# Patient Record
(demographics unavailable — no encounter records)

---

## 2025-01-10 NOTE — PHYSICAL EXAM
[Alert] : alert [Acute Distress] : no acute distress [Normocephalic] : normocephalic [Flat Open Anterior [place]] : flat open anterior fontanelle [Icteric sclera] : nonicteric sclera [PERRL] : PERRL [Red Reflex Bilateral] : red reflex bilateral [Normally Placed Ears] : normally placed ears [Auricles Well Formed] : auricles well formed [Clear Tympanic membranes] : clear tympanic membranes [Light reflex present] : light reflex present [Bony structures visible] : bony structures visible [Patent Auditory Canal] : patent auditory canal [Discharge] : no discharge [Nares Patent] : nares patent [Palate Intact] : palate intact [Uvula Midline] : uvula midline [Supple, full passive range of motion] : supple, full passive range of motion [Palpable Masses] : no palpable masses [Symmetric Chest Rise] : symmetric chest rise [Clear to Auscultation Bilaterally] : clear to auscultation bilaterally [Regular Rate and Rhythm] : regular rate and rhythm [S1, S2 present] : S1, S2 present [Murmurs] : no murmurs [+2 Femoral Pulses] : +2 femoral pulses [Soft] : soft [Tender] : nontender [Distended] : not distended [Bowel Sounds] : bowel sounds present [Umbilical Stump Dry, Clean, Intact] : umbilical stump dry, clean, intact [Hepatomegaly] : no hepatomegaly [Splenomegaly] : no splenomegaly [Normal external genitailia] : normal external genitalia [Central Urethral Opening] : central urethral opening [Testicles Descended Bilaterally] : testicles descended bilaterally [Patent] : patent [Normally Placed] : normally placed [No Abnormal Lymph Nodes Palpated] : no abnormal lymph nodes palpated [Catalan-Ortolani] : negative Catalan-Ortolani [Symmetric Flexed Extremities] : symmetric flexed extremities [Spinal Dimple] : no spinal dimple [Tuft of Hair] : no tuft of hair [Startle Reflex] : startle reflex present [Suck Reflex] : suck reflex present [Rooting] : rooting reflex present [Palmar Grasp] : palmar grasp present [Plantar Grasp] : plantar reflex present [Symmetric Dariana] : symmetric [place] [Jaundice] : not jaundice

## 2025-01-10 NOTE — PHYSICAL EXAM
[Alert] : alert [Acute Distress] : no acute distress [Normocephalic] : normocephalic [Flat Open Anterior [place]] : flat open anterior fontanelle [Icteric sclera] : nonicteric sclera [PERRL] : PERRL [Red Reflex Bilateral] : red reflex bilateral [Normally Placed Ears] : normally placed ears [Auricles Well Formed] : auricles well formed [Clear Tympanic membranes] : clear tympanic membranes [Light reflex present] : light reflex present [Bony structures visible] : bony structures visible [Patent Auditory Canal] : patent auditory canal [Discharge] : no discharge [Nares Patent] : nares patent [Palate Intact] : palate intact [Uvula Midline] : uvula midline [Supple, full passive range of motion] : supple, full passive range of motion [Palpable Masses] : no palpable masses [Symmetric Chest Rise] : symmetric chest rise [Clear to Auscultation Bilaterally] : clear to auscultation bilaterally [Regular Rate and Rhythm] : regular rate and rhythm [S1, S2 present] : S1, S2 present [Murmurs] : no murmurs [+2 Femoral Pulses] : +2 femoral pulses [Soft] : soft [Tender] : nontender [Distended] : not distended [Bowel Sounds] : bowel sounds present [Umbilical Stump Dry, Clean, Intact] : umbilical stump dry, clean, intact [Hepatomegaly] : no hepatomegaly [Splenomegaly] : no splenomegaly [Normal external genitailia] : normal external genitalia [Central Urethral Opening] : central urethral opening [Testicles Descended Bilaterally] : testicles descended bilaterally [Patent] : patent [Normally Placed] : normally placed [No Abnormal Lymph Nodes Palpated] : no abnormal lymph nodes palpated [Catalan-Ortolani] : negative Catalan-Ortolani [Symmetric Flexed Extremities] : symmetric flexed extremities [Spinal Dimple] : no spinal dimple [Tuft of Hair] : no tuft of hair [Startle Reflex] : startle reflex present [Suck Reflex] : suck reflex present [Rooting] : rooting reflex present [Palmar Grasp] : palmar grasp present [Plantar Grasp] : plantar reflex present [Symmetric Dariana] : symmetric [place] [Jaundice] : not jaundice

## 2025-01-10 NOTE — HISTORY OF PRESENT ILLNESS
[Normal] : Normal [No] : No cigarette smoke exposure [Exposure to electronic nicotine delivery system] : No exposure to electronic nicotine delivery system [Water heater temperature set at <120 degrees F] : Water heater temperature set at <120 degrees F [Rear facing car seat in back seat] : Rear facing car seat in back seat [Carbon Monoxide Detectors] : Carbon monoxide detectors at home [Smoke Detectors] : Smoke detectors at home. [FreeTextEntry1] : Hospital Course: Date/Time of Admission 2025 03:55 Admission Weight (KILOGRAMS) 3.03 kg Admission Weight (GRAMS) 3030 Gm Date/Time of Birth 2025 03:55 Admission Weight (POUNDS) 6 Admission Weight (OUNCES) 10.88 Ounce(s) Admission Height (CENTIMETERS) 50 cm Admission Height (INCHES) 19.68 Inch(s) Gestational Age at Birth (WEEKS) 39.5 Week(s) Admission Information Birth Sex: Male   Prenatal Complications:  Admitted From:  Place of Birth:  Resuscitation:  APGAR Scores: 1min:8  5min: 9  10 min: -- Infant Measurement Appropriate for gestational age (AGA) Hospital Course Peds requested at delivery for CAT II. Baby boy, AGA, born on  (03:55) at 39.5 wks via  to a 42 y/o , O+ blood type mother. Maternal history of IVF, abnormal pap resolved, left ovarian cyst, anxiety (sertraline). Prenatal history of **FA: trivial pulmonary regurgitation recommend f/u with peds cardio in 2-3 months. PNL nr/immune/-, GBS - on . SROM at 04:00 on  (~24 HRS) with clear fluids. Baby was w/d/s/s with APGARS of 8/9, void x1, NCx1. Mom would like to breastfeed, consents Hep B and consents circ. Tmax: 38.8C at 21:55, received zosyn at 22:23, tylenol at 22:01. EOS: 1.0 cbc and bcx done, bcx -ve 48hrs.  Since admission to the  nursery, baby has been feeding, voiding, and stooling appropriately. Vitals remained stable during admission. Baby received routine  care.  Discharge weight was 2770 g Weight Change Percentage: -8.58  Discharge Bilirubin Sternum 5.7  at 36 hours of life low risk zone, baby Coy positive with reassuring bilirubin levels  See below for hepatitis B vaccine status, hearing screen and CCHD results. G6PD level sent as part of [facility]  Screening Program. Results pending at time of discharge. Stable for discharge home with instructions to follow up with pediatrician in 1-2 days.  Given the increased risk of exposure to RSV at this time of the year, parents were offered Nirsevimab administration for the . The care team confirmed that baby's Mother did not receive RSV vaccine more than 2 weeks ago. Risks and benefits of Nirsevimab were discussed with the family, and parents would like for it to be administered prior to discharge. Educational materials provided, and all questions answered.  Maternal Information: Maternal History Demographic Information: Prenatal Care: Final AMADEO: Prenatal Lab Tests/Results: HBsAG: -- HIV: -- VDRL: -- Rubella: -- Rubeola: -- GBS Bacteriuria: -- GBS Screen 1st Trimester: -- GBS 36 Weeks: -- Blood Type: Blood Type: O positive  Pregnancy Conditions: Prenatal Medications: Maternal Information LABOR AND DELIVERY ROM:  Medications: Mode of Delivery: Vaginal Delivery  Anesthesia: Presentation: Complications: Discharge Data: Discharge Date 2025 Discharge Information Weight (grams): 2770  Weight (pounds): 6  Weight (ounces): 1.708  % weight change = -8.58% [ Based on Admission weight in grams = 3030.00(2025 07:17), Discharge weight in grams = 2770.00(2025 04:00)]  Height (centimeters): Height in inches = 19.7 [ Based on Height in centimeters = 50.00(2025 04:25)]  Head Circumference (centimeters):  Length of Stay (days): 2d Discharge Laboratory Results CBC: 17.7 15.64 )-----------( 181 ( 25 @ 12:25 ) 52.2   Chem: Liver Functions: Type & Screen: ( 25 @ 05:07 ) ABO/Rh/Coy: A Negative Positive      Bilirubin: (25 @ 12:25) Direct: 0.2 / Indirect: 2.8 / Total: 3.0  TSH: T4: Conditions at Discharge Well Med Reconciliation: Medication Reconciliation Status Admission Reconciliation is Completed Discharge Reconciliation is Completed Vaccine Information Hep B, adolescent or pediatric; 2025 04:30; Rani Rodriguez); AdNear; [id number] (Exp. Date: 13-Sep-2026); IntraMuscular; Vastus Lateralis Left.; 0.5 milliLiter(s); VIS (VIS Published: 12-May-2023, VIS Presented: 2025); RSV, mAb, nirsevimab-alip, 0.5 mL,  to 24 months; 2025 13:11; Zoë Shin (RN); Sanofi Pasteur; [id number] (Exp. Date: 03-Mar-2026); IntraMuscular; Vastus Lateralis Right.; 50 milliGRAM(s); VIS (VIS Published: 11-Dec-2024, VIS Presented: 2025); Synagis Risk Factors For more information on Synagis risk factors, visit: https://publications.aap.org/redbook/book/347/chapter/8362214/Respiratory-Syncyt ial-Virus Care Plan/Procedures: Goal(s) Healthy Baby Discharge Diagnoses, Assessment and Plan of Treatment PRINCIPAL DISCHARGE DIAGNOSIS Diagnosis: Single liveborn, born in hospital, delivered by vaginal delivery Assessment and Plan of Treatment: - Follow-up with your pediatrician within 48 hours of discharge. Routine Home Care Instructions: - Please call us for help if you feel sad, blue or overwhelmed for more than a few days after discharge - Umbilical cord care: - Please keep your baby's cord clean and dry (do not apply alcohol) - Please keep your baby's diaper below the umbilical cord until it has fallen off (~10-14 days) - Please do not submerge your baby in a bath until the cord has fallen off (sponge bath instead) - Continue feeding child at least every 3 hours, wake baby to feed if needed. Please contact your pediatrician and return to the hospital if you notice any of the following: - Fever (T > 100.4) - Reduced amount of wet diapers (< 5-6 per day) or no wet diaper in 12 hours - Increased fussiness, irritability, or crying inconsolably - Lethargy (excessively sleepy, difficult to arouse) - Breathing difficulties (noisy breathing, breathing fast, using belly and neck muscles to breath) - Changes in the babys color (yellow, blue, pale, gray) - Seizure or loss of consciousness   SECONDARY DISCHARGE DIAGNOSES Diagnosis: Need for observation and evaluation of  for sepsis Assessment and Plan of Treatment: Follow Up/Diet: Care Providers for Follow up (PCP/Outpatient Provider) [facility] [address], [address] [place] [address] Phone: [phone] Fax: [phone] Established Patient Follow Up Time: Additional Provider Info (For SysAdmin Use Only) PROVIDER:[TOKEN:[[id number]],ESTABLISHEDPATIENT:[T]] Care Providers Direct Addresses (For SYSAdmin Use Only) [email or web address] NPI number (For SysAdmin Use Only) : [[id number]] Follow-up Clinics (For SysAdmin Use Only) 617283:2 months|| ||00\01||False; Follow-up Clinics [facility] [address], [address] [place] [address] Phone: [phone] Fax: [phone] Follow Up Time: 2 months Patient's Scheduled Appointments [facility] [address] Scheduled Appointment: 2025   Additional Scheduled Appointments APPTS ARE READY TO BE MADE: [X] YES  Best Family or Patient Contact (if needed):  Additional Information about above appointments (if needed):  1: 2: 3:  Other comments or requests: [facility]-Prior to outreaching the patient, it was visible that the patient has secured a follow up appointment which was not scheduled by our team on  at 2pm with dr. silveira at [address] [place] [id number] and [phone] Patient was outreached but did not answer. A voicemail was left for the patient to return our call. x3  The patient has required monitoring during his/her stay, and/or will require outpatient follow up for the following congenital anomalies and clinical conditions, or any others noted in the problem list or physical exam: N/A Patient Current Diet  Patient Education/Instructions: Fall Risk Education For information on Fall & Injury Prevention, visit: [email or web address] -[email or web address] OR [email or web address]-injury OR https://www.cdc.gov/steadi/patient.html Feeding Instructions -Write Down: How many feedings, wet diapers and dirty diapers until seen by your Pediatrician. Burping -Burp after each feeding by supporting the baby on your lap, across your knees or on your shoulder. Pat or rub the 's back gently. MEDICATION INSTRUCTIONS . -Check with your Pediatrician before giving any medications to your baby. -See Discharge Medication Information for Patients and Families' Pocket Card. Parent's Discharge Checklist 1. I was told the name of the doctor(s) who took care of my child while in the hospital.  2. I have been told about any important findings on my child's plan of care.  3. The doctor clearly explained my child's diagnosis and other possible diagnoses that were considered.  4. My child's doctor explained all the tests that were done and their results (if available). I understand that some of the test results may not be ready before we go home and I was told how I can get these results. I understand that a summary of my child's hospitalization and important test results will be shared with my child's outpatient doctor.  5. My child's doctor talked to me about what I need to do when we go home.  6. I understand what signs and symptoms to watch for. I understand what symptoms I would need to call my doctor for and/or return to the hospital.  7. I have the phone number to call the hospital for results and/or questions after I leave the hospital.  APPEARANCE . -'s hands and feet may be bluish in color for a few days. - may have white spots (pimple-like) on the nose and/ or chin. These are Milia and are due to clogged sweat glands. Do not squeeze. - may have an elongated or misshapen head. The head is shaped according to the birth canal for easier birth. This is called molding of the head and will round out in a few days. -Puffy eyes may be due to the birth process or state mandated eye ointment.  ACTIVITY . -Wash hands before touching your baby. -Lay baby on back to sleep: firm mattress, no bumpers, pillows or things other than a blanket in crib. -Keep blanket away from the baby's face. -Bathe with a washcloth until cord falls off and if a boy until circumcision heals. -Limit visiting for 8 weeks and avoid public places. -Rear-facing car seat in backseat of car properly belted in. -Support the baby's head and hold the baby close. -It may be easier to cut the 's fingernails when the  is sleeping. Use a file until you can see that the skin is no longer attached to the nail. CORD CARE . -The cord will gradually dry up and fall off in 2-3 weeks. -Report redness, swelling or drainage from cord to pediatrician. CALL 911 . -If your baby has: Difficulty breathing; blue lips or tongue, and/or does not respond to touch. CALL YOUR PEDIATRICIAN OR RETURN TO THE HOSPITAL If your baby has any of the following, CALL YOUR PEDIATRICIAN OR RETURN TO THE HOSPITAL -WORSENING OF JAUNDICE (yellowing skin) moving from head to toe -Pale skin -Temperature greater than 100 F under arm or rectal temperature greater than 100.4 F -Increased irritability, crying for long periods of time -Abnormal drowsiness, prolonged sleepiness. -Poor feeding (fewer than 5 feedings in 24 hours) -Watery bowel movement or no bowel movement in 24 hours -Fewer than 5 wet diapers per day -Vomiting often or vomiting green material -Bleeding from circumcision site (boy babies only) -Bad smell from umbilical cord. Reddish color of skin around cord or drainage from the cord. -Congested cough, runny eyes, or runny nose Screens: Critical Congenital Heart Defect (CCHD) CCHD Screen []: Initial Pre-Ductal SpO2(%): 98 Post-Ductal SpO2(%): 100 SpO2 Difference(Pre MINUS Post): -2 Extremities Used: Right Hand, Left Foot Result: Passed Follow up: Normal Screen- (No follow-up needed) Infant Screen Screen#: [id number] Screen Date: 2025 Screen Comment: N/A Final Hearing Screen Right ear hearing screen completed date: 2025 Right ear screen method: EOAE (evoked otoacoustic emission) Right ear screen result: Passed Right ear screen comment: N/A  Left ear hearing screen completed date: 2025 Left ear screen method: EOAE (evoked otoacoustic emission) Left ear screen result: Passed Left ear screen comments: N/A Transcutaneous Bilirubin Site: Sternum (2025 04:00) Bilirubin: 5.7 (2025 04:00) Bilirubin: 5.7 (2025 16:57) Site: Sternum (2025 16:57) Site: Sternum (2025 04:15) Bilirubin: 4.8 (2025 04:15) Site: Sternum (2025 20:17) Bilirubin: 4.3 (2025 20:17) Document Complete: Patient ready for discharge by RN (Click here to generate discharge paperwork) Patient/Caregiver provided printed discharge information. Physician Section Complete This document is complete and the patient is ready for discharge. For questions about your prescriptions, please call [phone] Is this contact telephone number correct? Yes Attending Attestation Statement I have personally seen and examined the patient. I have collaborated with and supervised the . ACP . on the discharge service for the patient. I have reviewed and made amendments to the documentation where necessary. Attending Discharge Physical Examination Discharge Physical Exam:  Gen: awake, alert, active HEENT: anterior fontanel open soft and flat. no cleft lip/palate, ears normal set, no ear pits or tags, no lesions in mouth/throat, red reflex positive bilaterally, nares clinically patent Resp: good air entry and clear to auscultation bilaterally Cardiac: Normal S1/S2, regular rate and rhythm, no murmurs, rubs or gallops, 2+ femoral pulses bilaterally Abd: soft, non tender, non distended, normal bowel sounds, no organomegaly, umbilicus clean/dry/intact Neuro: +grasp/suck/cash, normal tone Extremities: negative bearden and ortolani, full range of motion x 4, no clavicular crepitus Skin: pink Genital Exam: testes palpable bilaterally, normal male anatomy, lazaro 1, anus visually patent Date of Discharge Service 2025 Discharging Attending Physician MD [facility] Financial Assistance Health system provides services at a reduced cost to those who are determined to be eligible through [facility] financial assistance program. Information regarding [facility] financial assistance program can be found by going to [email or web address] or by calling [phone]. [facility] Patient Portal Link You can access the FollowMyHealth Patient Portal offered by [facility] by registering at the following website: [email or web address]. By joining [facility] FollowMyHealth portal, you will also be able to view your health information using other applications (apps) compatible with our system.   Electronic Signatures: Vera Ortiz (Access Services) (Signed 2025 10:23) Authored: Follow Up/Diet Kalpana Suárez) (Signed 2025 11:47) Authored: Hospital Course, Discharge Data, Med Reconciliation, Follow Up/Diet, Screens, Document Complete Zoë Shin (RN) (Signed 2025 13:31) Authored: Hospital Course, Med Reconciliation, Document Complete Kaylen Silva (NP) (Signed 2025 05:43) Authored: Hospital Course, Maternal Information, Discharge Data, Med Reconciliation, Care Plan/Procedures, Follow Up/Diet, Patient Education/Instructions, Screens, Document Complete   Last Updated: 2025 10:23 by Vera Ortiz (Access Services)

## 2025-01-10 NOTE — DISCUSSION/SUMMARY
[Normal Growth] : growth [Normal Development] : developmental [No Elimination Concerns] : elimination [Continue Regimen] : feeding [No Skin Concerns] : skin [Normal Sleep Pattern] : sleep [None] : no known medical problems [Anticipatory Guidance Given] : Anticipatory guidance addressed as per the history of present illness section [No Vaccines] : no vaccines needed [No Medications] : ~He/She~ is not on any medications [Parent/Guardian] : Parent/Guardian [FreeTextEntry1] : 3 day old M well IVF   Trivial pulmonary regurg on prenatal US received rsv and hep B vaccines in the hospital  went from 6-11 to 6-2 in the hospital  yi [place] on discharge  RTC in 3 days for weight check

## 2025-01-10 NOTE — HISTORY OF PRESENT ILLNESS
[Normal] : Normal [No] : No cigarette smoke exposure [Exposure to electronic nicotine delivery system] : No exposure to electronic nicotine delivery system [Water heater temperature set at <120 degrees F] : Water heater temperature set at <120 degrees F [Rear facing car seat in back seat] : Rear facing car seat in back seat [Carbon Monoxide Detectors] : Carbon monoxide detectors at home [Smoke Detectors] : Smoke detectors at home. [FreeTextEntry1] : Hospital Course: Date/Time of Admission 2025 03:55 Admission Weight (KILOGRAMS) 3.03 kg Admission Weight (GRAMS) 3030 Gm Date/Time of Birth 2025 03:55 Admission Weight (POUNDS) 6 Admission Weight (OUNCES) 10.88 Ounce(s) Admission Height (CENTIMETERS) 50 cm Admission Height (INCHES) 19.68 Inch(s) Gestational Age at Birth (WEEKS) 39.5 Week(s) Admission Information Birth Sex: Male   Prenatal Complications:  Admitted From:  Place of Birth:  Resuscitation:  APGAR Scores: 1min:8  5min: 9  10 min: -- Infant Measurement Appropriate for gestational age (AGA) Hospital Course Peds requested at delivery for CAT II. Baby boy, AGA, born on  (03:55) at 39.5 wks via  to a 40 y/o , O+ blood type mother. Maternal history of IVF, abnormal pap resolved, left ovarian cyst, anxiety (sertraline). Prenatal history of **FA: trivial pulmonary regurgitation recommend f/u with peds cardio in 2-3 months. PNL nr/immune/-, GBS - on . SROM at 04:00 on  (~24 HRS) with clear fluids. Baby was w/d/s/s with APGARS of 8/9, void x1, NCx1. Mom would like to breastfeed, consents Hep B and consents circ. Tmax: 38.8C at 21:55, received zosyn at 22:23, tylenol at 22:01. EOS: 1.0 cbc and bcx done, bcx -ve 48hrs.  Since admission to the  nursery, baby has been feeding, voiding, and stooling appropriately. Vitals remained stable during admission. Baby received routine  care.  Discharge weight was 2770 g Weight Change Percentage: -8.58  Discharge Bilirubin Sternum 5.7  at 36 hours of life low risk zone, baby Coy positive with reassuring bilirubin levels  See below for hepatitis B vaccine status, hearing screen and CCHD results. G6PD level sent as part of [facility]  Screening Program. Results pending at time of discharge. Stable for discharge home with instructions to follow up with pediatrician in 1-2 days.  Given the increased risk of exposure to RSV at this time of the year, parents were offered Nirsevimab administration for the . The care team confirmed that baby's Mother did not receive RSV vaccine more than 2 weeks ago. Risks and benefits of Nirsevimab were discussed with the family, and parents would like for it to be administered prior to discharge. Educational materials provided, and all questions answered.  Maternal Information: Maternal History Demographic Information: Prenatal Care: Final AMADEO: Prenatal Lab Tests/Results: HBsAG: -- HIV: -- VDRL: -- Rubella: -- Rubeola: -- GBS Bacteriuria: -- GBS Screen 1st Trimester: -- GBS 36 Weeks: -- Blood Type: Blood Type: O positive  Pregnancy Conditions: Prenatal Medications: Maternal Information LABOR AND DELIVERY ROM:  Medications: Mode of Delivery: Vaginal Delivery  Anesthesia: Presentation: Complications: Discharge Data: Discharge Date 2025 Discharge Information Weight (grams): 2770  Weight (pounds): 6  Weight (ounces): 1.708  % weight change = -8.58% [ Based on Admission weight in grams = 3030.00(2025 07:17), Discharge weight in grams = 2770.00(2025 04:00)]  Height (centimeters): Height in inches = 19.7 [ Based on Height in centimeters = 50.00(2025 04:25)]  Head Circumference (centimeters):  Length of Stay (days): 2d Discharge Laboratory Results CBC: 17.7 15.64 )-----------( 181 ( 25 @ 12:25 ) 52.2   Chem: Liver Functions: Type & Screen: ( 25 @ 05:07 ) ABO/Rh/Coy: A Negative Positive      Bilirubin: (25 @ 12:25) Direct: 0.2 / Indirect: 2.8 / Total: 3.0  TSH: T4: Conditions at Discharge Well Med Reconciliation: Medication Reconciliation Status Admission Reconciliation is Completed Discharge Reconciliation is Completed Vaccine Information Hep B, adolescent or pediatric; 2025 04:30; Rani Rodriguez); Firetide; [id number] (Exp. Date: 13-Sep-2026); IntraMuscular; Vastus Lateralis Left.; 0.5 milliLiter(s); VIS (VIS Published: 12-May-2023, VIS Presented: 2025); RSV, mAb, nirsevimab-alip, 0.5 mL,  to 24 months; 2025 13:11; Zoë Shin (RN); [employer]; [id number] (Exp. Date: 03-Mar-2026); IntraMuscular; Vastus Lateralis Right.; 50 milliGRAM(s); VIS (VIS Published: 11-Dec-2024, VIS Presented: 2025); Synagis Risk Factors For more information on Synagis risk factors, visit: https://publications.aap.org/redbook/book/347/chapter/3445904/Respiratory-Syncyt ial-Virus Care Plan/Procedures: Goal(s) Healthy Baby Discharge Diagnoses, Assessment and Plan of Treatment PRINCIPAL DISCHARGE DIAGNOSIS Diagnosis: Single liveborn, born in hospital, delivered by vaginal delivery Assessment and Plan of Treatment: - Follow-up with your pediatrician within 48 hours of discharge. Routine Home Care Instructions: - Please call us for help if you feel sad, blue or overwhelmed for more than a few days after discharge - Umbilical cord care: - Please keep your baby's cord clean and dry (do not apply alcohol) - Please keep your baby's diaper below the umbilical cord until it has fallen off (~10-14 days) - Please do not submerge your baby in a bath until the cord has fallen off (sponge bath instead) - Continue feeding child at least every 3 hours, wake baby to feed if needed. Please contact your pediatrician and return to the hospital if you notice any of the following: - Fever (T > 100.4) - Reduced amount of wet diapers (< 5-6 per day) or no wet diaper in 12 hours - Increased fussiness, irritability, or crying inconsolably - Lethargy (excessively sleepy, difficult to arouse) - Breathing difficulties (noisy breathing, breathing fast, using belly and neck muscles to breath) - Changes in the babys color (yellow, blue, pale, gray) - Seizure or loss of consciousness   SECONDARY DISCHARGE DIAGNOSES Diagnosis: Need for observation and evaluation of  for sepsis Assessment and Plan of Treatment: Follow Up/Diet: Care Providers for Follow up (PCP/Outpatient Provider) [facility] [address], [address] [place] [address] Phone: [phone] Fax: [phone] Established Patient Follow Up Time: Additional Provider Info (For SysAdmin Use Only) PROVIDER:[TOKEN:[[id number]],ESTABLISHEDPATIENT:[T]] Care Providers Direct Addresses (For SYSAdmin Use Only) ,[email or web address] NPI number (For SysAdmin Use Only) : [[id number]] Follow-up Clinics (For SysAdmin Use Only) [id number]:2 months|| ||00\01||False; Follow-up Clinics [facility] [address], [address] [place] [address] Phone: [phone] Fax: [phone] Follow Up Time: 2 months Patient's Scheduled Appointments Michelle Silveira [facility] [address] Scheduled Appointment: 2025   Additional Scheduled Appointments APPTS ARE READY TO BE MADE: [X] YES  Best Family or Patient Contact (if needed):  Additional Information about above appointments (if needed):  1: 2: 3:  Other comments or requests: [facility]-Prior to outreaching the patient, it was visible that the patient has secured a follow up appointment which was not scheduled by our team on  at 2pm with dr. silveira at [address] [place] [id number] and [phone] Patient was outreached but did not answer. A voicemail was left for the patient to return our call. x3  The patient has required monitoring during his/her stay, and/or will require outpatient follow up for the following congenital anomalies and clinical conditions, or any others noted in the problem list or physical exam: N/A Patient Current Diet  Patient Education/Instructions: Fall Risk Education For information on Fall & Injury Prevention, visit: [email or web address] -mobility OR [email or web address]-[email or web address]-injury OR https://www.cdc.gov/steadi/patient.html Feeding Instructions -Write Down: How many feedings, wet diapers and dirty diapers until seen by your Pediatrician. Burping -Burp after each feeding by supporting the baby on your lap, across your knees or on your shoulder. Pat or rub the 's back gently. MEDICATION INSTRUCTIONS . -Check with your Pediatrician before giving any medications to your baby. -See Discharge Medication Information for Patients and Families' Pocket Card. Parent's Discharge Checklist 1. I was told the name of the doctor(s) who took care of my child while in the hospital.  2. I have been told about any important findings on my child's plan of care.  3. The doctor clearly explained my child's diagnosis and other possible diagnoses that were considered.  4. My child's doctor explained all the tests that were done and their results (if available). I understand that some of the test results may not be ready before we go home and I was told how I can get these results. I understand that a summary of my child's hospitalization and important test results will be shared with my child's outpatient doctor.  5. My child's doctor talked to me about what I need to do when we go home.  6. I understand what signs and symptoms to watch for. I understand what symptoms I would need to call my doctor for and/or return to the hospital.  7. I have the phone number to call the hospital for results and/or questions after I leave the hospital.  APPEARANCE . -'s hands and feet may be bluish in color for a few days. - may have white spots (pimple-like) on the nose and/ or chin. These are Milia and are due to clogged sweat glands. Do not squeeze. - may have an elongated or misshapen head. The head is shaped according to the birth canal for easier birth. This is called molding of the head and will round out in a few days. -Puffy eyes may be due to the birth process or state mandated eye ointment.  ACTIVITY . -Wash hands before touching your baby. -Lay baby on back to sleep: firm mattress, no bumpers, pillows or things other than a blanket in crib. -Keep blanket away from the baby's face. -Bathe with a washcloth until cord falls off and if a boy until circumcision heals. -Limit visiting for 8 weeks and avoid public places. -Rear-facing car seat in backseat of car properly belted in. -Support the baby's head and hold the baby close. -It may be easier to cut the 's fingernails when the  is sleeping. Use a file until you can see that the skin is no longer attached to the nail. CORD CARE . -The cord will gradually dry up and fall off in 2-3 weeks. -Report redness, swelling or drainage from cord to pediatrician. CALL 911 . -If your baby has: Difficulty breathing; blue lips or tongue, and/or does not respond to touch. CALL YOUR PEDIATRICIAN OR RETURN TO THE HOSPITAL If your baby has any of the following, CALL YOUR PEDIATRICIAN OR RETURN TO THE HOSPITAL -WORSENING OF JAUNDICE (yellowing skin) moving from head to toe -Pale skin -Temperature greater than 100 F under arm or rectal temperature greater than 100.4 F -Increased irritability, crying for long periods of time -Abnormal drowsiness, prolonged sleepiness. -Poor feeding (fewer than 5 feedings in 24 hours) -Watery bowel movement or no bowel movement in 24 hours -Fewer than 5 wet diapers per day -Vomiting often or vomiting green material -Bleeding from circumcision site (boy babies only) -Bad smell from umbilical cord. Reddish color of skin around cord or drainage from the cord. -Congested cough, runny eyes, or runny nose Screens: Critical Congenital Heart Defect (CCHD) CCHD Screen []: Initial Pre-Ductal SpO2(%): 98 Post-Ductal SpO2(%): 100 SpO2 Difference(Pre MINUS Post): -2 Extremities Used: Right Hand, Left Foot Result: Passed Follow up: Normal Screen- (No follow-up needed) Infant Screen Screen#: [id number] Screen Date: 2025 Screen Comment: N/A Final Hearing Screen Right ear hearing screen completed date: 2025 Right ear screen method: EOAE (evoked otoacoustic emission) Right ear screen result: Passed Right ear screen comment: N/A  Left ear hearing screen completed date: 2025 Left ear screen method: EOAE (evoked otoacoustic emission) Left ear screen result: Passed Left ear screen comments: N/A Transcutaneous Bilirubin Site: Sternum (2025 04:00) Bilirubin: 5.7 (2025 04:00) Bilirubin: 5.7 (2025 16:57) Site: Sternum (2025 16:57) Site: Sternum (2025 04:15) Bilirubin: 4.8 (2025 04:15) Site: Sternum (2025 20:17) Bilirubin: 4.3 (2025 20:17) Document Complete: Patient ready for discharge by RN (Click here to generate discharge paperwork) Patient/Caregiver provided printed discharge information. Physician Section Complete This document is complete and the patient is ready for discharge. For questions about your prescriptions, please call [phone] Is this contact telephone number correct? Yes Attending Attestation Statement I have personally seen and examined the patient. I have collaborated with and supervised the . ACP . on the discharge service for the patient. I have reviewed and made amendments to the documentation where necessary. Attending Discharge Physical Examination Discharge Physical Exam:  Gen: awake, alert, active HEENT: anterior fontanel open soft and flat. no cleft lip/palate, ears normal set, no ear pits or tags, no lesions in mouth/throat, red reflex positive bilaterally, nares clinically patent Resp: good air entry and clear to auscultation bilaterally Cardiac: Normal S1/S2, regular rate and rhythm, no murmurs, rubs or gallops, 2+ femoral pulses bilaterally Abd: soft, non tender, non distended, normal bowel sounds, no organomegaly, umbilicus clean/dry/intact Neuro: +grasp/suck/cash, normal tone Extremities: negative bearden and ortolani, full range of motion x 4, no clavicular crepitus Skin: pink Genital Exam: testes palpable bilaterally, normal male anatomy, lazaro 1, anus visually patent Date of Discharge Service 2025 Discharging Attending Physician MD Jasbir Financial Assistance [facility] provides services at a reduced cost to those who are determined to be eligible through [employer] financial assistance program. Information regarding [facility] financial assistance program can be found by going to [email or web address] or by calling [phone]. [facility] Patient Portal Link You can access the FollowMyHealth Patient Portal offered by [facility] by registering at the following website: [email or web address]. By joining [employer] FollowMyHealth portal, you will also be able to view your health information using other applications (apps) compatible with our system.   Electronic Signatures: Vera Ortiz (Access Services) (Signed 2025 10:23) Authored: Follow Up/Diet Kalpana Suárez) (Signed 2025 11:47) Authored: Hospital Course, Discharge Data, Med Reconciliation, Follow Up/Diet, Screens, Document Complete Zoë Shin (RN) (Signed 2025 13:31) Authored: Hospital Course, Med Reconciliation, Document Complete Kaylen Silva (NP) (Signed 2025 05:43) Authored: Hospital Course, Maternal Information, Discharge Data, Med Reconciliation, Care Plan/Procedures, Follow Up/Diet, Patient Education/Instructions, Screens, Document Complete   Last Updated: 2025 10:23 by Vera Ortiz (Access Services)

## 2025-01-11 NOTE — HISTORY OF PRESENT ILLNESS
[de-identified] : weight check [FreeTextEntry6] : Jatin is a 4 day old ex 39.5 weeker here with parents for a follow up weight check.  Baby is taking 30cc of Magali formula per feed every 2-3 hours. Mom has tried to breast feed but feels her milk supply is not adequate and baby is not satiated. When she does pump she gets about 0.5 oz.  Making >7 wet and stool diapers a day.  Birth weight 3030g Yesterday: 2820g Weight today:2890g  + 70g

## 2025-01-11 NOTE — DISCUSSION/SUMMARY
[FreeTextEntry1] : Jatin is a 4do ex 39.5 weeker here with parents for weight check. He has gained 70g in the last day and is making adequate number of wet diapers. Appears well hydrated on exam. Will have lactation speak to mom about ways to increase breast milk supply. Recommend continuing to supplement with formula until milk supply comes in. Fever precautions reviewed.

## 2025-01-11 NOTE — HISTORY OF PRESENT ILLNESS
[de-identified] : weight check [FreeTextEntry6] : Jatin is a 4 day old ex 39.5 weeker here with parents for a follow up weight check.  Baby is taking 30cc of Magali formula per feed every 2-3 hours. Mom has tried to breast feed but feels her milk supply is not adequate and baby is not satiated. When she does pump she gets about 0.5 oz.  Making >7 wet and stool diapers a day.  Birth weight 3030g Yesterday: 2820g Weight today:2890g  + 70g

## 2025-01-15 NOTE — DISCUSSION/SUMMARY
[FreeTextEntry1] : 9 day old here for weight check  Feeding well  Recommend exclusive breastfeeding, 8-12 feedings per day. Mother should continue prenatal vitamins and avoid alcohol. If formula is needed, recommend iron-fortified formulations every 2-3 hrs. When in car, patient should be in rear-facing car seat in back seat. Air dry umbillical stump. Put baby to sleep on back, in own crib with no loose or soft bedding. Limit baby's exposure to others, especially those with fever or unknown vaccine status.

## 2025-02-11 NOTE — PHYSICAL EXAM
[Acute Distress] : no acute distress [Discharge] : no discharge [Palpable Masses] : no palpable masses [Murmurs] : no murmurs [Tender] : nontender [Distended] : not distended [Hepatomegaly] : no hepatomegaly [Splenomegaly] : no splenomegaly [Catalan-Ortolani] : negative Catalan-Ortolani [Spinal Dimple] : no spinal dimple [Tuft of Hair] : no tuft of hair [Jaundice] : no jaundice [Rash and/or lesion present] : no rash/lesion

## 2025-02-11 NOTE — DISCUSSION/SUMMARY
[FreeTextEntry1] : Ryan is a 1m male here for [facility]. Well appearing in office today. Return precautions provided, routine follow up recommended.

## 2025-02-11 NOTE — HISTORY OF PRESENT ILLNESS
[At risk for exposure to TB] : Not at risk for exposure to Tuberculosis  [FreeTextEntry1] : Patient Ryan is a 1m male here for [facility]. Dad just wanted to know if baby needed vitamin D supplement.   PMH: medical history: none medications: none allergies: none surgeries: none hospitalizations: none VUTD? yes given at birth, both hep B and mom got RSV birth hx: born to term, vaginal, no complications

## 2025-02-11 NOTE — HISTORY OF PRESENT ILLNESS
[At risk for exposure to TB] : Not at risk for exposure to Tuberculosis  [FreeTextEntry1] : Patient Ryan is a 1m male here for [facility]. Dad just wanted to know if baby needed vitamin D supplement.   PMH: medical history: none medications: none allergies: none surgeries: none hospitalizations: none VUTD? yes given at birth, both hep B and mom got RSV birth hx: born to term, vaginal, no complications

## 2025-03-04 NOTE — DISCUSSION/SUMMARY
[Normal Growth] : growth [Normal Development] : development  [No Elimination Concerns] : elimination [Continue Regimen] : feeding [No Skin Concerns] : skin [Normal Sleep Pattern] : sleep [None] : no medical problems [Anticipatory Guidance Given] : Anticipatory guidance addressed as per the history of present illness section [Parental (Maternal) Well-Being] : parental (maternal) well-being [Infant-Family Synchrony] : infant-family synchrony [Nutritional Adequacy] : nutritional adequacy [Infant Behavior] : infant behavior [Safety] : safety [Age Approp Vaccines] : Age appropriate vaccines administered [No Medications] : ~He/She~ is not on any medications [Parent/Guardian] : Parent/Guardian [FreeTextEntry1] : 2 month old suboptimal growth and FARNAZ  Star ENfamil AR RTC in one mo for weight check

## 2025-03-04 NOTE — DISCUSSION/SUMMARY
[FreeTextEntry1] : L. thigh: Weston MURILLO thigh: Prevnar 20    Oral: Rotateq     Pt tolerated well.    [] : The components of the vaccine(s) to be administered today are listed in the plan of care. The disease(s) for which the vaccine(s) are intended to prevent and the risks have been discussed with the caretaker.  The risks are also included in the appropriate vaccination information statements which have been provided to the patient's caregiver.  The caregiver has given consent to vaccinate.

## 2025-03-04 NOTE — PHYSICAL EXAM
[Alert] : alert [Acute Distress] : no acute distress [Normocephalic] : normocephalic [Flat Open Anterior [place]] : flat open anterior fontanelle [PERRL] : PERRL [Red Reflex Bilateral] : red reflex bilateral [Normally Placed Ears] : normally placed ears [Auricles Well Formed] : auricles well formed [Clear Tympanic membranes] : clear tympanic membranes [Light reflex present] : light reflex present [Bony landmarks visible] : bony landmarks visible [Discharge] : no discharge [Nares Patent] : nares patent [Palate Intact] : palate intact [Uvula Midline] : uvula midline [Supple, full passive range of motion] : supple, full passive range of motion [Palpable Masses] : no palpable masses [Symmetric Chest Rise] : symmetric chest rise [Clear to Auscultation Bilaterally] : clear to auscultation bilaterally [Regular Rate and Rhythm] : regular rate and rhythm [S1, S2 present] : S1, S2 present [Murmurs] : no murmurs [+2 Femoral Pulses] : +2 femoral pulses [Soft] : soft [Tender] : nontender [Distended] : not distended [Bowel Sounds] : bowel sounds present [Hepatomegaly] : no hepatomegaly [Splenomegaly] : no splenomegaly [Normal external genitailia] : normal external genitalia [Central Urethral Opening] : central urethral opening [Testicles Descended Bilaterally] : testicles descended bilaterally [Normally Placed] : normally placed [No Abnormal Lymph Nodes Palpated] : no abnormal lymph nodes palpated [Catalan-Ortolani] : negative Catalan-Ortolani [Symmetric Flexed Extremities] : symmetric flexed extremities [Spinal Dimple] : no spinal dimple [Tuft of Hair] : no tuft of hair [Startle Reflex] : startle reflex present [Suck Reflex] : suck reflex present [Rooting] : rooting reflex present [Palmar Grasp] : palmar grasp reflex present [Plantar Grasp] : plantar grasp reflex present [Symmetric Dariana] : symmetric [place] [Rash and/or lesion present] : no rash/lesion

## 2025-04-09 NOTE — HISTORY OF PRESENT ILLNESS
[de-identified] : weight check [FreeTextEntry6] : Here to check weight  Feeding on demand  No vomiting No other concerns

## 2025-04-09 NOTE — HISTORY OF PRESENT ILLNESS
[de-identified] : weight check [FreeTextEntry6] : Here to check weight  Feeding on demand  No vomiting No other concerns

## 2025-04-09 NOTE — DISCUSSION/SUMMARY
[FreeTextEntry1] : 3 month old M with good weight gain, mother describing colic  Recommend the following to reduce crying: Try to change baby's bottle or nipple.  Feed him or her in a sitting-up position and burp him or her often.  Carry your baby more during the day in your arms, a sling, or a front carrier.  Put your baby in his or her car seat, and put the car seat in a safe place near a , clothes dryer, or other source of "white noise". Take your baby for a ride in the car.  Give your baby a pacifier.  Put your baby in a baby swing.  Massage your baby's belly.  Swaddle your baby.  Put a warm water bottle on your baby's belly. Give your baby a warm bath. Try chamomile, fennel seed, or gripe water.   RTC in one month for 4 mo [facility] Routine care Recommend exclusive breastfeeding, 8-12 feedings per day. Mother should continue prenatal vitamins and avoid alcohol. If formula is needed, recommend iron-fortified formulations, 2-4 oz every 3-4 hrs. When in car, patient should be in rear-facing car seat in back seat. Put baby to sleep on back, in own crib with no loose or soft bedding. Help baby to maintain sleep and feeding routines. May offer pacifier if needed. Continue tummy time when awake. Parents counseled to call if rectal temperature >100.4 degrees F.

## 2025-04-09 NOTE — HISTORY OF PRESENT ILLNESS
[de-identified] : weight check [FreeTextEntry6] : Here to check weight  Feeding on demand  No vomiting No other concerns

## 2025-04-09 NOTE — DISCUSSION/SUMMARY
[FreeTextEntry1] : 3 month old M with good weight gain, mother describing colic  Recommend the following to reduce crying: Try to change baby's bottle or nipple.  Feed him or her in a sitting-up position and burp him or her often.  Carry your baby more during the day in your arms, a sling, or a front carrier.  Put your baby in his or her car seat, and put the car seat in a safe place near a , clothes dryer, or other source of "white noise". Take your baby for a ride in the car.  Give your baby a pacifier.  Put your baby in a baby swing.  Massage your baby's belly.  Swaddle your baby.  Put a warm water bottle on your baby's belly. Give your baby a warm bath. Try chamomile, fennel seed, or gripe water.   RTC in one month for 4 mo [facility] Routine care Recommend exclusive breastfeeding, 8-12 feedings per day. Mother should continue prenatal vitamins and avoid alcohol. If formula is needed, recommend iron-fortified formulations, 2-4 oz every 3-4 hrs. When in car, patient should be in rear-facing car seat in back seat. Put baby to sleep on back, in own crib with no loose or soft bedding. Help baby to maintain sleep and feeding routines. May offer pacifier if needed. Continue tummy time when awake. Parents counseled to call if rectal temperature >100.4 degrees F.

## 2025-04-09 NOTE — DISCUSSION/SUMMARY
[FreeTextEntry1] : 3 month old M with good weight gain, mother describing colic  Recommend the following to reduce crying: Try to change baby's bottle or nipple.  Feed him or her in a sitting-up position and burp him or her often.  Carry your baby more during the day in your arms, a sling, or a front carrier.  Put your baby in his or her car seat, and put the car seat in a safe place near a , clothes dryer, or other source of "white noise". Take your baby for a ride in the car.  Give your baby a pacifier.  Put your baby in a baby swing.  Massage your baby's belly.  Swaddle your baby.  Put a warm water bottle on your baby's belly. Give your baby a warm bath. Try chamomile, fennel seed, or gripe water.   RTC in one month for 4 mo [facility] Routine care Recommend exclusive breastfeeding, 8-12 feedings per day. Mother should continue prenatal vitamins and avoid alcohol. If formula is needed, recommend iron-fortified formulations, 2-4 oz every 3-4 hrs. When in car, patient should be in rear-facing car seat in back seat. Put baby to sleep on back, in own crib with no loose or soft bedding. Help baby to maintain sleep and feeding routines. May offer pacifier if needed. Continue tummy time when awake. Parents counseled to call if rectal temperature >100.4 degrees F.

## 2025-05-13 NOTE — DISCUSSION/SUMMARY
[FreeTextEntry1] : L. thigh: Pentacel   R. thigh: Prevnar 20   Oral: Rotateq   Pt tolerated well.   [] : The components of the vaccine(s) to be administered today are listed in the plan of care. The disease(s) for which the vaccine(s) are intended to prevent and the risks have been discussed with the caretaker.  The risks are also included in the appropriate vaccination information statements which have been provided to the patient's caregiver.  The caregiver has given consent to vaccinate.

## 2025-05-15 NOTE — PHYSICAL EXAM
[Alert] : alert [Normocephalic] : normocephalic [Flat Open Anterior [place]] : flat open anterior fontanelle [Red Reflex] : red reflex bilateral [PERRL] : PERRL [Normally Placed Ears] : normally placed ears [Auricles Well Formed] : auricles well formed [Clear Tympanic membranes] : clear tympanic membranes [Light reflex present] : light reflex present [Bony landmarks visible] : bony landmarks visible [Nares Patent] : nares patent [Palate Intact] : palate intact [Uvula Midline] : uvula midline [Symmetric Chest Rise] : symmetric chest rise [Clear to Auscultation Bilaterally] : clear to auscultation bilaterally [Regular Rate and Rhythm] : regular rate and rhythm [S1, S2 present] : S1, S2 present [+2 Femoral Pulses] : (+) 2 femoral pulses [Soft] : soft [Bowel Sounds] : bowel sounds present [Central Urethral Opening] : central urethral opening [Testicles Descended] : testicles descended bilaterally [Patent] : patent [Normally Placed] : normally placed [No Abnormal Lymph Nodes Palpated] : no abnormal lymph nodes palpated [Startle Reflex] : startle reflex present [Plantar Grasp] : plantar grasp reflex present [Symmetric Dariana] : symmetric dariana [Acute Distress] : no acute distress [Discharge] : no discharge [Palpable Masses] : no palpable masses [Murmurs] : no murmurs [Tender] : nontender [Distended] : nondistended [Hepatomegaly] : no hepatomegaly [Splenomegaly] : no splenomegaly [Catalan-Ortolani] : negative Catalan-Ortolani [Allis Sign] : negative Allis sign [Spinal Dimple] : no spinal dimple [Tuft of Hair] : no tuft of hair [Rash or Lesions] : no rash/lesions

## 2025-05-15 NOTE — PHYSICAL EXAM
[Alert] : alert [Normocephalic] : normocephalic [Flat Open Anterior [place]] : flat open anterior fontanelle [Red Reflex] : red reflex bilateral [PERRL] : PERRL [Normally Placed Ears] : normally placed ears [Auricles Well Formed] : auricles well formed [Clear Tympanic membranes] : clear tympanic membranes [Light reflex present] : light reflex present [Bony landmarks visible] : bony landmarks visible [Nares Patent] : nares patent [Palate Intact] : palate intact [Uvula Midline] : uvula midline [Symmetric Chest Rise] : symmetric chest rise [Clear to Auscultation Bilaterally] : clear to auscultation bilaterally [Regular Rate and Rhythm] : regular rate and rhythm [S1, S2 present] : S1, S2 present [+2 Femoral Pulses] : (+) 2 femoral pulses [Soft] : soft [Bowel Sounds] : bowel sounds present [Central Urethral Opening] : central urethral opening [Testicles Descended] : testicles descended bilaterally [Patent] : patent [Normally Placed] : normally placed [No Abnormal Lymph Nodes Palpated] : no abnormal lymph nodes palpated [Startle Reflex] : startle reflex present [Plantar Grasp] : plantar grasp reflex present [Symmetric Dariana] : symmetric dariana [Acute Distress] : no acute distress [Discharge] : no discharge [Palpable Masses] : no palpable masses [Murmurs] : no murmurs [Tender] : nontender [Distended] : nondistended [Hepatomegaly] : no hepatomegaly [Splenomegaly] : no splenomegaly [Catalan-Ortolani] : negative Catalan-Ortolani [Allis Sign] : negative Allis sign [Spinal Dimple] : no spinal dimple [Tuft of Hair] : no tuft of hair [Rash or Lesions] : no rash/lesions

## 2025-05-15 NOTE — DISCUSSION/SUMMARY
[Normal Growth] : growth [Normal Development] : development  [No Elimination Concerns] : elimination [Continue Regimen] : feeding [No Skin Concerns] : skin [Normal Sleep Pattern] : sleep [None] : no medical problems [Anticipatory Guidance Given] : Anticipatory guidance addressed as per the history of present illness section [Family Functioning] : family functioning [Nutritional Adequacy and Growth] : nutritional adequacy and growth [Infant Development] : infant development [Oral Health] : oral health [Safety] : safety [Age Approp Vaccines] : Age appropriate vaccines administered [No Medications] : ~He/She~ is not on any medications [Parent/Guardian] : Parent/Guardian [FreeTextEntry1] : 4 month old M well child  Recommend breastfeeding, 8-12 feedings per day. Mother should continue prenatal vitamins and avoid alcohol. If formula is needed, recommend iron-fortified formulations, 2-4 oz every 3-4 hrs. Cereal may be introduced using a spoon and bowl. When in car, patient should be in rear-facing car seat in back seat. Put baby to sleep on back, in own crib with no loose or soft bedding. Lower crib matress. Help baby to maintain sleep and feeding routines. May offer pacifier if needed. Continue tummy time when awake.

## 2025-05-15 NOTE — HISTORY OF PRESENT ILLNESS
[Mother] : mother [In Bassinet/Crib] : sleeps in bassinet/crib [Loose bedding, pillow, toys, and/or bumpers in crib] : no loose bedding, pillow, toys, and/or bumpers in crib [FreeTextEntry1] : 7 ounces of Enfamil AR every few hours for feeds  stool/urine ok  sleep ok

## 2025-05-15 NOTE — HISTORY OF PRESENT ILLNESS
Detail Level: Zone Detail Level: Detailed [Mother] : mother [In Bassinet/Crib] : sleeps in bassinet/crib [Loose bedding, pillow, toys, and/or bumpers in crib] : no loose bedding, pillow, toys, and/or bumpers in crib [FreeTextEntry1] : 7 ounces of Enfamil AR every few hours for feeds  stool/urine ok  sleep ok

## 2025-07-17 NOTE — PHYSICAL EXAM
[Alert] : alert [Acute Distress] : no acute distress [Normocephalic] : normocephalic [Flat Open Anterior [place]] : flat open anterior fontanelle [Red Reflex] : red reflex bilateral [PERRL] : PERRL [Normally Placed Ears] : normally placed ears [Auricles Well Formed] : auricles well formed [Clear Tympanic membranes] : clear tympanic membranes [Light reflex present] : light reflex present [Bony landmarks visible] : bony landmarks visible [Discharge] : no discharge [Nares Patent] : nares patent [Palate Intact] : palate intact [Uvula Midline] : uvula midline [Tooth Eruption] : no tooth eruption [Supple, full passive range of motion] : supple, full passive range of motion [Palpable Masses] : no palpable masses [Symmetric Chest Rise] : symmetric chest rise [Clear to Auscultation Bilaterally] : clear to auscultation bilaterally [Regular Rate and Rhythm] : regular rate and rhythm [S1, S2 present] : S1, S2 present [Murmurs] : no murmurs [+2 Femoral Pulses] : (+) 2 femoral pulses [Soft] : soft [Tender] : nontender [Distended] : nondistended [Bowel Sounds] : bowel sounds present [Hepatomegaly] : no hepatomegaly [Splenomegaly] : no splenomegaly [Central Urethral Opening] : central urethral opening [Testicles Descended] : testicles descended bilaterally [Patent] : patent [Normally Placed] : normally placed [No Abnormal Lymph Nodes Palpated] : no abnormal lymph nodes palpated [Catalan-Ortolani] : negative Catalan-Ortolani [Allis Sign] : negative Allis sign [Symmetric Buttocks Creases] : symmetric buttocks creases [Spinal Dimple] : no spinal dimple [Tuft of Hair] : no tuft of hair [Plantar Grasp] : plantar grasp reflex present [Cranial Nerves Grossly Intact] : cranial nerves grossly intact [Rash or Lesions] : no rash/lesions

## 2025-07-17 NOTE — PHYSICAL EXAM
[Alert] : alert [Acute Distress] : no acute distress [Normocephalic] : normocephalic [Flat Open Anterior [place]] : flat open anterior fontanelle [Red Reflex] : red reflex bilateral [PERRL] : PERRL [Normally Placed Ears] : normally placed ears [Auricles Well Formed] : auricles well formed [Clear Tympanic membranes] : clear tympanic membranes [Light reflex present] : light reflex present [Bony landmarks visible] : bony landmarks visible [Discharge] : no discharge [Nares Patent] : nares patent [Palate Intact] : palate intact [Uvula Midline] : uvula midline [Tooth Eruption] : no tooth eruption [Supple, full passive range of motion] : supple, full passive range of motion [Palpable Masses] : no palpable masses [Symmetric Chest Rise] : symmetric chest rise [Clear to Auscultation Bilaterally] : clear to auscultation bilaterally [Regular Rate and Rhythm] : regular rate and rhythm [S1, S2 present] : S1, S2 present [Murmurs] : no murmurs [+2 Femoral Pulses] : (+) 2 femoral pulses [Soft] : soft [Tender] : nontender [Distended] : nondistended [Bowel Sounds] : bowel sounds present [Hepatomegaly] : no hepatomegaly [Splenomegaly] : no splenomegaly [Central Urethral Opening] : central urethral opening [Testicles Descended] : testicles descended bilaterally [Patent] : patent [Normally Placed] : normally placed [No Abnormal Lymph Nodes Palpated] : no abnormal lymph nodes palpated [Catalan-Ortolani] : negative Catalan-Ortolani [Allis Sign] : negative Allis sign [Symmetric Buttocks Creases] : symmetric buttocks creases [Spinal Dimple] : no spinal dimple [Tuft of Hair] : no tuft of hair [Plantar Grasp] : plantar grasp reflex present [Cranial Nerves Grossly Intact] : cranial nerves grossly intact [Rash or Lesions] : no rash/lesions

## 2025-07-17 NOTE — HISTORY OF PRESENT ILLNESS
[PCV 20] : PCV 20 [DTaP/IPV/Hib/HepB] : DTaP/IPV/Hib/HepB [Rotavirus] : Rotavirus [Parents] : parents [Well-balanced] : well-balanced [Normal] : Normal [No] : No cigarette smoke exposure [Water heater temperature set at <120 degrees F] : Water heater temperature set at <120 degrees F [Rear facing car seat in back seat] : Rear facing car seat in back seat [Carbon Monoxide Detectors] : Carbon monoxide detectors at home [Smoke Detectors] : Smoke detectors at home.

## 2025-07-17 NOTE — DEVELOPMENTAL MILESTONES
[Normal Development] : Normal Development [Pats or smiles at reflection] : pats or smiles at reflection [Begins to turn when name called] : begins to turn when name called [Babbles] : babbles [Rolls over prone to supine] : rolls over prone to supine [Sits briefly without support] : sits briefly without support [Reaches for object and transfers] : reaches for object and transfers [Rakes small object with 4 fingers] : rakes small object with 4 fingers [[place] small object on surface] : bangs small object on surface

## 2025-07-17 NOTE — PHYSICAL EXAM
[Alert] : alert [Acute Distress] : no acute distress [Normocephalic] : normocephalic [Flat Open Anterior [place]] : flat open anterior fontanelle [Red Reflex] : red reflex bilateral [PERRL] : PERRL [Normally Placed Ears] : normally placed ears [Auricles Well Formed] : auricles well formed [Clear Tympanic membranes] : clear tympanic membranes [Light reflex present] : light reflex present [Bony landmarks visible] : bony landmarks visible [Discharge] : no discharge [Nares Patent] : nares patent [Palate Intact] : palate intact [Uvula Midline] : uvula midline [Tooth Eruption] : no tooth eruption [Supple, full passive range of motion] : supple, full passive range of motion [Palpable Masses] : no palpable masses [Symmetric Chest Rise] : symmetric chest rise [Clear to Auscultation Bilaterally] : clear to auscultation bilaterally [Regular Rate and Rhythm] : regular rate and rhythm [S1, S2 present] : S1, S2 present [Murmurs] : no murmurs [+2 Femoral Pulses] : (+) 2 femoral pulses [Soft] : soft [Tender] : nontender [Distended] : nondistended [Bowel Sounds] : bowel sounds present [Hepatomegaly] : no hepatomegaly [Splenomegaly] : no splenomegaly [Central Urethral Opening] : central urethral opening [Testicles Descended] : testicles descended bilaterally [Patent] : patent [Normally Placed] : normally placed [No Abnormal Lymph Nodes Palpated] : no abnormal lymph nodes palpated [Catalan-Ortolani] : negative Catalan-Ortolani [Allis Sign] : negative Allis sign [Symmetric Buttocks Creases] : symmetric buttocks creases [Spinal Dimple] : no spinal dimple [Tuft of Hair] : no tuft of hair [Plantar Grasp] : plantar grasp reflex present [Cranial Nerves Grossly Intact] : cranial nerves grossly intact [Rash or Lesions] : no rash/lesions

## 2025-07-17 NOTE — DEVELOPMENTAL MILESTONES
[Normal Development] : Normal Development [Pats or smiles at reflection] : pats or smiles at reflection [Begins to turn when name called] : begins to turn when name called [Babbles] : babbles [Rolls over prone to supine] : rolls over prone to supine [Sits briefly without support] : sits briefly without support [Reaches for object and transfers] : reaches for object and transfers [Rakes small object with 4 fingers] : rakes small object with 4 fingers [[place] small object on surface] : bangs small object on surface

## 2025-07-17 NOTE — DISCUSSION/SUMMARY
[] : The components of the vaccine(s) to be administered today are listed in the plan of care. The disease(s) for which the vaccine(s) are intended to prevent and the risks have been discussed with the caretaker.  The risks are also included in the appropriate vaccination information statements which have been provided to the patient's caregiver.  The caregiver has given consent to vaccinate. [Normal Growth] : growth [Normal Development] : development [None] : No medical problems [No Elimination Concerns] : elimination [No Feeding Concerns] : feeding [No Skin Concerns] : skin [Normal Sleep Pattern] : sleep [Family Functioning] : family functioning [Nutrition and Feeding] : nutrition and feeding [Infant Development] : infant development [Oral Health] : oral health [Safety] : safety [No Medications] : ~He/She~ is not on any medications [Parent/Guardian] : parent/guardian [FreeTextEntry1] : 6 month old M well child  Recommend breastfeeding, 8-12 feedings per day. If formula is needed, 2-4 oz every 3-4 hrs. Introduce single-ingredient foods rich in iron, one at a time. Incorporate up to 4 oz of flourinated water daily in a sippy cup. When teeth erupt wipe daily with washcloth. When in car, patient should be in rear-facing car seat in back seat. Put baby to sleep on back, in own crib with no loose or soft bedding. Lower crib matress. Help baby to maintain sleep and feeding routines. May offer pacifier if needed. Continue tummy time when awake. Ensure home is safe since baby is now more mobile. Do not use infant walker. Read aloud to baby.

## 2025-07-17 NOTE — DEVELOPMENTAL MILESTONES
[Normal Development] : Normal Development [Pats or smiles at reflection] : pats or smiles at reflection [Begins to turn when name called] : begins to turn when name called [Babbles] : babbles [Rolls over prone to supine] : rolls over prone to supine [Sits briefly without support] : sits briefly without support [Reaches for object and transfers] : reaches for object and transfers [Rakes small object with 4 fingers] : rakes small object with 4 fingers [[place] small object on surface] : bangs small object on surface